# Patient Record
Sex: FEMALE | Race: WHITE | NOT HISPANIC OR LATINO | Employment: OTHER | URBAN - METROPOLITAN AREA
[De-identification: names, ages, dates, MRNs, and addresses within clinical notes are randomized per-mention and may not be internally consistent; named-entity substitution may affect disease eponyms.]

---

## 2024-01-02 ENCOUNTER — TELEPHONE (OUTPATIENT)
Dept: PRIMARY CARE | Facility: CLINIC | Age: 74
End: 2024-01-02

## 2024-01-17 ENCOUNTER — APPOINTMENT (OUTPATIENT)
Dept: VASCULAR SURGERY | Facility: HOSPITAL | Age: 74
End: 2024-01-17

## 2024-02-02 ENCOUNTER — OFFICE VISIT (OUTPATIENT)
Dept: VASCULAR SURGERY | Facility: HOSPITAL | Age: 74
End: 2024-02-02
Payer: COMMERCIAL

## 2024-02-02 VITALS — SYSTOLIC BLOOD PRESSURE: 154 MMHG | HEART RATE: 77 BPM | OXYGEN SATURATION: 95 % | DIASTOLIC BLOOD PRESSURE: 83 MMHG

## 2024-02-02 DIAGNOSIS — I71.21 ASCENDING AORTIC ANEURYSM, UNSPECIFIED WHETHER RUPTURED (CMS-HCC): ICD-10-CM

## 2024-02-02 DIAGNOSIS — I71.23 ANEURYSM OF DESCENDING THORACIC AORTA WITHOUT RUPTURE (CMS-HCC): Primary | ICD-10-CM

## 2024-02-02 PROCEDURE — 1157F ADVNC CARE PLAN IN RCRD: CPT | Performed by: SURGERY

## 2024-02-02 PROCEDURE — 99214 OFFICE O/P EST MOD 30 MIN: CPT | Performed by: SURGERY

## 2024-02-02 PROCEDURE — 1036F TOBACCO NON-USER: CPT | Performed by: SURGERY

## 2024-02-02 PROCEDURE — 99204 OFFICE O/P NEW MOD 45 MIN: CPT | Performed by: SURGERY

## 2024-02-02 PROCEDURE — 1159F MED LIST DOCD IN RCRD: CPT | Performed by: SURGERY

## 2024-02-02 RX ORDER — CLONAZEPAM 0.5 MG/1
0.5 TABLET ORAL DAILY
COMMUNITY
Start: 2015-12-30

## 2024-02-02 RX ORDER — ATENOLOL 50 MG/1
50 TABLET ORAL DAILY
COMMUNITY
Start: 2019-10-08

## 2024-02-02 RX ORDER — FERROUS SULFATE 325(65) MG
1 TABLET, DELAYED RELEASE (ENTERIC COATED) ORAL
COMMUNITY

## 2024-02-02 RX ORDER — FLUOXETINE HYDROCHLORIDE 40 MG/1
2 CAPSULE ORAL DAILY
COMMUNITY
Start: 2023-12-29

## 2024-02-02 RX ORDER — GLIPIZIDE 2.5 MG/1
2.5 TABLET, EXTENDED RELEASE ORAL DAILY
COMMUNITY
Start: 2023-05-04

## 2024-02-02 RX ORDER — VIBEGRON 75 MG/1
1 TABLET, FILM COATED ORAL DAILY
COMMUNITY
Start: 2023-09-19

## 2024-02-02 RX ORDER — TRAMADOL HYDROCHLORIDE 50 MG/1
50 TABLET ORAL EVERY 6 HOURS PRN
COMMUNITY

## 2024-02-02 RX ORDER — ASPIRIN 81 MG/1
81 TABLET ORAL DAILY
COMMUNITY
Start: 2024-01-16

## 2024-02-02 RX ORDER — SOLIFENACIN SUCCINATE 10 MG/1
10 TABLET, FILM COATED ORAL DAILY PRN
COMMUNITY
Start: 2023-09-19

## 2024-02-02 RX ORDER — AMLODIPINE BESYLATE 2.5 MG/1
2.5 TABLET ORAL DAILY
COMMUNITY
Start: 2023-06-12

## 2024-02-02 RX ORDER — ALBUTEROL SULFATE 90 UG/1
1 AEROSOL, METERED RESPIRATORY (INHALATION)
COMMUNITY

## 2024-02-02 RX ORDER — LOPERAMIDE HYDROCHLORIDE 2 MG/1
2 CAPSULE ORAL 3 TIMES DAILY PRN
COMMUNITY
Start: 2023-07-10

## 2024-02-02 RX ORDER — ACETAMINOPHEN 325 MG/1
650 TABLET ORAL EVERY 6 HOURS PRN
COMMUNITY
Start: 2015-12-30

## 2024-02-02 RX ORDER — ATORVASTATIN CALCIUM 10 MG/1
10 TABLET, FILM COATED ORAL DAILY
COMMUNITY
Start: 2023-05-03

## 2024-02-02 RX ORDER — BUPROPION HYDROCHLORIDE 150 MG/1
150 TABLET ORAL DAILY
COMMUNITY

## 2024-02-02 ASSESSMENT — ENCOUNTER SYMPTOMS
LOSS OF SENSATION IN FEET: 0
DEPRESSION: 0
OCCASIONAL FEELINGS OF UNSTEADINESS: 1

## 2024-02-07 ENCOUNTER — HOSPITAL ENCOUNTER (OUTPATIENT)
Dept: RADIOLOGY | Facility: EXTERNAL LOCATION | Age: 74
Discharge: HOME | End: 2024-02-07

## 2024-02-07 NOTE — PROGRESS NOTES
Vascular Surgery Clinic Note    Subjective    History of Present Illness  Hailee Lyons is a pleasant 73 y.o. female with a history of COPD, hypertension, hyperlipidemia, diabetes, lung cancer, and CKD who is presenting to clinic for evaluation of a descending thoracic aortic aneurysm with mention of a penetrating aortic ulcer. The aortic aneurysm was first seen in December 2023 and then demonstrated on repeat scan from January 2024. Both scans performed at an outside hospital and unavailable for review.     The patient denies any history of dissection, aneurysm, tearing chest pain. Her uncle may have had an aneurysm, however, she does not recall where it was located. No other family history of aneurysmal disease or connective tissue disorders. Reports good blood pressure control.    Past Medical History  As above.    Social History  Social History     Tobacco Use    Smoking status: Former     Types: Cigarettes     Quit date: 2021     Years since quitting: 3.1    Smokeless tobacco: Never   Substance Use Topics    Alcohol use: Not on file       Medications  Current Outpatient Medications   Medication Instructions    acetaminophen (TYLENOL) 650 mg, oral, Every 6 hours PRN    albuterol 90 mcg/actuation inhaler 1 puff, inhalation, 3 times daily RT    amLODIPine (NORVASC) 2.5 mg, oral, Daily    aspirin 81 mg, oral, Daily    atenolol (TENORMIN) 50 mg, oral, Daily    atorvastatin (LIPITOR) 10 mg, oral, Daily    buPROPion XL (WELLBUTRIN XL) 150 mg, oral, Daily    clonazePAM (KLONOPIN) 0.5 mg, oral, Daily    ferrous sulfate 325 (65 Fe) MG EC tablet 1 tablet, oral, Daily with breakfast    FLUoxetine (PROzac) 40 mg capsule 2 capsules, oral, Daily    Gemtesa 75 mg tablet 1 tablet, oral, Daily    glipiZIDE XL (GLUCOTROL XL) 2.5 mg, oral, Daily    loperamide (IMODIUM) 2 mg, oral, 3 times daily PRN    solifenacin (VESICARE) 10 mg, oral, Daily PRN    traMADol (ULTRAM) 50 mg, oral, Every 6 hours PRN        Allergies  No Known  Allergies        Objective   Last Recorded Vitals   Blood pressure 154/83, pulse 77, SpO2 95 %.    Physical Exam  CONSTITUTIONAL & GENERAL: Alert and oriented. No acute distress. Well-nourished. Well-groomed.  EYES: No scleral icterus. Conjunctiva clear.  ENMT: No rhinorrhea. Moist oral mucosa.   NECK: Supple. No JVD. No pre- or post-auricular, cervical, supra- or infra-clavicular lymphadenopathy.   THORACIC: Symmetrical expansion and chest rise.  RESPIRATORY: Normal effort on room air. No stridor.  CARDIAC: Regular rate and rhythm.   ABDOMINAL: Slightly protuberant. Nondistended. Soft. Nontender to palpation. Liver edge smooth.   SKIN: Normal turgor. No rashes or ulcers.   MUSCULOSKELETAL: Normal strength and tone.  EXTREMITIES: No digital clubbing or cyanosis. No gross extremity deformity.  NEUROLOGICAL: Grossly intact. No focal deficits. Moves all extremities spontaneously.  PSYCHIATRIC: Appropriate mood and affect.     VASCULAR:   No carotid bruits.   Symmetric, palpable bilateral radial and femoral pulses.    Capillary refill < 2 sec.   No lower extremity edema.    Labs   Lab Results   Component Value Date     09/13/2023    K 4.1 09/13/2023     09/13/2023    BUN 30 (H) 09/13/2023    CREATININE 1.62 (H) 09/13/2023      Lab Results   Component Value Date    WBC 13.6 (H) 09/13/2023    HGB 9.8 (L) 09/13/2023    HCT 30.9 (L) 09/13/2023        Imaging   Imaging was unavailable for review at the initial visit.       Assessment/Plan   Hailee Lyons is a pleasant 73 y.o. female with an ascending aortic aneurysm measuring 4 cm and also a descending thoracic aortic aneurysm measuring 4.9 x 3.6 cm on report with some mention of a penetrating aortic ulcer.    Plan   We will request her images to determine if any intervention is needed  We will call the patient with further plans after reviewing her outside imaging         Patient seen and discussed with the attending, Dr. Andersen.    Gaurav Perez MD  Vascular  Surgery Fellow  Available on Secure Chat

## 2024-02-15 DIAGNOSIS — I71.23 ANEURYSM OF DESCENDING THORACIC AORTA WITHOUT RUPTURE (CMS-HCC): Primary | ICD-10-CM

## 2024-02-23 ENCOUNTER — LAB (OUTPATIENT)
Dept: LAB | Facility: LAB | Age: 74
End: 2024-02-23
Payer: COMMERCIAL

## 2024-02-23 DIAGNOSIS — I71.23 ANEURYSM OF DESCENDING THORACIC AORTA WITHOUT RUPTURE (CMS-HCC): ICD-10-CM

## 2024-02-23 LAB
ANION GAP SERPL CALC-SCNC: 15 MMOL/L (ref 10–20)
BUN SERPL-MCNC: 28 MG/DL (ref 6–23)
CALCIUM SERPL-MCNC: 9.5 MG/DL (ref 8.6–10.6)
CHLORIDE SERPL-SCNC: 102 MMOL/L (ref 98–107)
CO2 SERPL-SCNC: 25 MMOL/L (ref 21–32)
CREAT SERPL-MCNC: 1.4 MG/DL (ref 0.5–1.05)
EGFRCR SERPLBLD CKD-EPI 2021: 40 ML/MIN/1.73M*2
GLUCOSE SERPL-MCNC: 137 MG/DL (ref 74–99)
POTASSIUM SERPL-SCNC: 4.2 MMOL/L (ref 3.5–5.3)
SODIUM SERPL-SCNC: 138 MMOL/L (ref 136–145)

## 2024-02-23 PROCEDURE — 36415 COLL VENOUS BLD VENIPUNCTURE: CPT

## 2024-02-23 PROCEDURE — 80048 BASIC METABOLIC PNL TOTAL CA: CPT

## 2024-02-28 DIAGNOSIS — I71.23 ANEURYSM OF DESCENDING THORACIC AORTA WITHOUT RUPTURE (CMS-HCC): Primary | ICD-10-CM

## 2024-02-29 DIAGNOSIS — I71.23 ANEURYSM OF DESCENDING THORACIC AORTA WITHOUT RUPTURE (CMS-HCC): Primary | ICD-10-CM

## 2024-03-04 ENCOUNTER — HOSPITAL ENCOUNTER (OUTPATIENT)
Dept: RADIOLOGY | Facility: CLINIC | Age: 74
Discharge: HOME | End: 2024-03-04
Payer: COMMERCIAL

## 2024-03-04 DIAGNOSIS — I71.23 ANEURYSM OF DESCENDING THORACIC AORTA WITHOUT RUPTURE (CMS-HCC): ICD-10-CM

## 2024-03-04 PROCEDURE — 71275 CT ANGIOGRAPHY CHEST: CPT | Performed by: RADIOLOGY

## 2024-03-04 PROCEDURE — 74174 CTA ABD&PLVS W/CONTRAST: CPT | Performed by: RADIOLOGY

## 2024-03-04 PROCEDURE — 2550000001 HC RX 255 CONTRASTS: Performed by: NURSE PRACTITIONER

## 2024-03-04 PROCEDURE — 71275 CT ANGIOGRAPHY CHEST: CPT

## 2024-03-04 RX ADMIN — IOHEXOL 75 ML: 350 INJECTION, SOLUTION INTRAVENOUS at 11:30

## 2024-03-08 DIAGNOSIS — I71.23 ANEURYSM OF DESCENDING THORACIC AORTA WITHOUT RUPTURE (CMS-HCC): Primary | ICD-10-CM

## 2024-04-10 ENCOUNTER — HOSPITAL ENCOUNTER (OUTPATIENT)
Dept: RESPIRATORY THERAPY | Facility: HOSPITAL | Age: 74
Discharge: HOME | End: 2024-04-10
Payer: COMMERCIAL

## 2024-04-10 DIAGNOSIS — I71.23 ANEURYSM OF DESCENDING THORACIC AORTA WITHOUT RUPTURE (CMS-HCC): ICD-10-CM

## 2024-04-10 LAB
ARTERIAL PATENCY WRIST A: POSITIVE
BASE EXCESS BLDA CALC-SCNC: 2.5 MMOL/L (ref -2–3)
BODY TEMPERATURE: 37 DEGREES CELSIUS
COHGB MFR BLDA: 2.1 %
DO-HGB MFR BLDA: 1.4 % (ref 0–5)
HCO3 BLDA-SCNC: 26.3 MMOL/L (ref 22–26)
HGB BLDA-MCNC: 11.9 G/DL (ref 12–16)
INHALED O2 CONCENTRATION: 21 %
METHGB MFR BLDA: 0.7 % (ref 0–1.5)
OXYHGB MFR BLDA: 95.8 % (ref 94–98)
PCO2 BLDA: 37 MM HG (ref 38–42)
PH BLDA: 7.46 PH (ref 7.38–7.42)
PO2 BLDA: 86 MM HG (ref 85–95)
SAO2 % BLDA: 99 % (ref 94–100)
SITE OF ARTERIAL PUNCTURE: ABNORMAL

## 2024-04-10 PROCEDURE — 36600 WITHDRAWAL OF ARTERIAL BLOOD: CPT

## 2024-04-10 PROCEDURE — 82810 BLOOD GASES O2 SAT ONLY: CPT

## 2024-04-10 PROCEDURE — 94729 DIFFUSING CAPACITY: CPT

## 2024-04-17 ENCOUNTER — TELEMEDICINE (OUTPATIENT)
Dept: VASCULAR SURGERY | Facility: HOSPITAL | Age: 74
End: 2024-04-17
Payer: COMMERCIAL

## 2024-04-17 ENCOUNTER — APPOINTMENT (OUTPATIENT)
Dept: VASCULAR SURGERY | Facility: HOSPITAL | Age: 74
End: 2024-04-17
Payer: COMMERCIAL

## 2024-04-17 DIAGNOSIS — I71.23 ANEURYSM OF DESCENDING THORACIC AORTA WITHOUT RUPTURE (CMS-HCC): Primary | ICD-10-CM

## 2024-04-17 LAB
MGC ASCENT PFT - FEV1 - POST: 1.78
MGC ASCENT PFT - FEV1 - POST: 1.78
MGC ASCENT PFT - FEV1 - PRE: 1.69
MGC ASCENT PFT - FEV1 - PRE: 1.69
MGC ASCENT PFT - FEV1 - PREDICTED: 2.12
MGC ASCENT PFT - FEV1 - PREDICTED: 2.12
MGC ASCENT PFT - FVC - POST: 2.52
MGC ASCENT PFT - FVC - POST: 2.52
MGC ASCENT PFT - FVC - PRE: 2.46
MGC ASCENT PFT - FVC - PRE: 2.46
MGC ASCENT PFT - FVC - PREDICTED: 2.76
MGC ASCENT PFT - FVC - PREDICTED: 2.76

## 2024-04-17 PROCEDURE — 1157F ADVNC CARE PLAN IN RCRD: CPT | Performed by: SURGERY

## 2024-04-17 PROCEDURE — 1159F MED LIST DOCD IN RCRD: CPT | Performed by: SURGERY

## 2024-04-17 PROCEDURE — 99213 OFFICE O/P EST LOW 20 MIN: CPT | Performed by: SURGERY

## 2024-05-01 NOTE — PROGRESS NOTES
Vascular Surgery Clinic Note    CC: FUV aortic aneurysm     History Of Present Illness:   Hailee Lyons is a 73 y.o. female here as telephone visit to discuss recent CT scan. She has a saccular descending thoracic aortic aneurysm from penetrating aortic ulcer that measures 5 cm, which is stable in size from her CT scan performed in 2024. Her aneurysm extends to just above the celiac axis. She denies chest, back or abdominal pain.     She has a history of COPD, HTN, HLD, DM CKD and stage 1 lung cancer (s/p RT). Her FEV1 is 1.78L.     Medical History:  There is no problem list on file for this patient.       SH:    Social Determinants of Health     Tobacco Use: Medium Risk (2024)    Received from [facility]    Patient History     Smoking Tobacco Use: Former     Smokeless Tobacco Use: Never     Passive Exposure: Never   Alcohol Use: Not on file   Financial Resource Strain: Not on File (2023)    Received from [facility] in [place]     Financial Resource Strain     Financial Resource Strain: 0   Food Insecurity: Not on File (2023)    Received from [facility] in [place]     Food Insecurity     Food: 0   Transportation Needs: Not on File (2023)    Received from [facility] in [place]     Transportation Needs     Transportation: 0   Physical Activity: Not on File (2023)    Received from [facility] in [place]     Physical Activity     Physical Activity: 0   Stress: Not on File (2023)    Received from [facility] in [place]     Stress     Stress: 0   Social Connections: Not on File (2023)    Received from [facility] in [place]     Social Connections     Social Connections and Isolation: 0   Intimate Partner Violence: Not on file   Depression: Not at risk (2019)    Received from [facility]    PHQ-2     PHQ2 Score: 0   Housing Stability: Not on File (2023)    Received from [facility] in [place]     Housing Stability     Housin   Utilities: Not on file   Digital Equity: Not on file   Health Literacy: Not on file        FH:  No family history on file.     Allergies:   No Known  Allergies    ROS:  All systems were reviewed and noted to be negative, other than described above.     Objective:  Last Recorded Vitals  There were no vitals taken for this visit.    Meds:   Current Outpatient Medications   Medication Instructions    acetaminophen (TYLENOL) 650 mg, oral, Every 6 hours PRN    albuterol 90 mcg/actuation inhaler 1 puff, inhalation, 3 times daily RT    amLODIPine (NORVASC) 2.5 mg, oral, Daily    aspirin 81 mg, oral, Daily    atenolol (TENORMIN) 50 mg, oral, Daily    atorvastatin (LIPITOR) 10 mg, oral, Daily    buPROPion XL (WELLBUTRIN XL) 150 mg, oral, Daily    clonazePAM (KLONOPIN) 0.5 mg, oral, Daily    ferrous sulfate 325 (65 Fe) MG EC tablet 1 tablet, oral, Daily with breakfast    FLUoxetine (PROzac) 40 mg capsule 2 capsules, oral, Daily    Gemtesa 75 mg tablet 1 tablet, oral, Daily    glipiZIDE XL (GLUCOTROL XL) 2.5 mg, oral, Daily    loperamide (IMODIUM) 2 mg, oral, 3 times daily PRN    solifenacin (VESICARE) 10 mg, oral, Daily PRN    traMADol (ULTRAM) 50 mg, oral, Every 6 hours PRN       Exam:  Unable to obtain - virtual visit     Imaging Reviewed:  CT angio chest abdomen pelvis 03/04/2024    Narrative  Interpreted By:  Rodrick Breen and Baker Zachary  STUDY:  CT ANGIO CHEST ABDOMEN PELVIS;  3/4/2024 11:28 am    INDICATION:  Signs/Symptoms:descending thoracic aortic aneurysm.    Patient is a 73-year-old female with history of stage I non-small  cell lung cancer of the right upper lobe status post SBRT on 03/2021.  Review of outside imaging revealed progressive consolidative airspace  opacities within the right perihilar region consistent with post  radiation changes. History of descending thoracic aortic aneurysms  and penetrating aortic ulcers. Presents for further evaluation.    COMPARISON:  CT abdomen pelvis on 01/07/2024. CT chest on 12/29/2023    ACCESSION NUMBER(S):  [id number]    ORDERING CLINICIAN:  LONA JORGE    PROCEDURE:  CT ANGIOGRAM OF THE CHEST, ABDOMEN  AND PELVIS    TECHNIQUE:  High-resolution contrast-enhanced helical CT of the chest, abdomen,  and pelvis was performed, without contrast and timed to the arterial  phase.  3-D processing was performed by the physician on an  independent work station, with MIP and volume-rendering techniques.  Total of  ml of  was injected intravenously during the examination.  The study was performed without oral contrast. The patient tolerated  the injection without complications.    FINDINGS:  VASCULAR:    Pulmonary Arteries: Mild dilation of the main pulmonary artery,  measuring approximately 3.1 cm. No acute pulmonary embolism.    Thoracic Aorta: Non-contrast images show no evidence of acute  intramural hematoma. Aneurysmal dilatation of the distal descending  thoracic aorta measuring approximately 4.5 x 4.1 cm in the transaxial  dimension, mildly increased in size compared to prior exam. There is  redemonstration of a large penetrating aortic ulcer within the  anterior aspect of the distal descending thoracic aorta, measuring  approximately 4 x 2.1 cm (series 501, image 554). Greatest true the  axial diameter of the aorta at this level measures 5.0 cm. There is  an additional smaller penetrating aortic ulcer more inferiorly within  the left aspect of the distal descending thoracic aorta (series 501,  image 660), similar to prior exam. More upstream, there is a left  laterally extending penetrating atherosclerotic are sorry arising  from the upstream aspect of the descending thoracic aorta.    Images of the abdominal aorta demonstrate diffuse atherosclerotic  change without significant focal stenosis or aneurysm.    Celiac artery demonstrates no significant focal stenosis.    Superior mesenteric artery demonstrates no significant focal stenosis.    Inferior mesenteric artery demonstrates no significant focal stenosis.    There is a single right renal artery.  Right renal artery  demonstrates  no significant focal stenosis.   There  is a single  renal vein which is patent.    There  is a single left renal artery.  Left renal artery demonstrates  at least mild ostial stenosis. There  is a single renal vein which is  patent.    Right common iliac artery  demonstrates diffuse atherosclerotic  change without significant focal stenosis. Right external iliac  artery  is widely patent with no significant stenosis. Right internal  iliac artery  demonstrates diffuse atherosclerotic change without  significant focal stenosis.    Left common iliac artery  demonstrates diffuse atherosclerotic change  without significant focal stenosis. Left external iliac artery  is  widely patent with no significant stenosis. Left internal iliac  artery  demonstrates diffuse atherosclerotic change without  significant focal stenosis.    NONVASCULAR FINDINGS:    CHEST:    LUNG/PLEURA/LARGE AIRWAYS:  Redemonstration of irregular consolidation within the right upper  lobe within the right perihilar region, measuring approximately 3.7 x  3.6 cm, overall similar in size and appearance to prior exam.  Additional irregular consolidative opacities within the right lower  lobe along the right major fissure, similar to prior exam. No new  lung masses or pulmonary nodules identified. No pleural effusion or  pneumothorax.    HEART:  The heart is normal in size. No pericardial effusion. Severe coronary  artery calcifications.    MEDIASTINUM AND SILVINA:  No mediastinal, hilar or axillary lymph nodes are present. The  esophagus appears normal.    CHEST WALL AND LOWER NECK:  Within normal limits. Redemonstration of a 2.5 x 2 cm hypodense  lesion within the left thyroid lobe, similar in size and appearance  to prior exam. Additional hypodense lesion within the inferior right  thyroid lobe, similar to prior exam.    ABDOMEN:    LIVER:  The liver is normal in size without evidence of focal liver lesions.    BILE DUCTS:  The intrahepatic and extrahepatic ducts are not  dilated.    GALLBLADDER:  Hyperdense layering within the gallbladder, likely representing small  stone. No gallbladder distention or wall thickening.    PANCREAS:  The pancreas appears unremarkable without evidence of ductal  dilatation or masses.    SPLEEN:  The spleen is normal in size. Similar size and appearance multiple  hypodense lesions within the inferior aspect of the spleen, favored  to represent simple cysts. No new splenic lesions identified.    ADRENAL GLANDS:  Redemonstration of 2.1 cm hypodense lesion within the left adrenal  gland, similar to prior exam. Right adrenal gland is normal in  appearance.    KIDNEYS AND URETERS:  The kidneys mildly atrophic and enhance symmetrically.  Redemonstration of multiple hypoattenuating lesions throughout the  bilateral kidneys, similar in size and appearance to prior exam,  favored to represent simple renal cysts. Small nonobstructing renal  calculi within the bilateral kidneys. No hydroureteronephrosis.    PELVIS:    BLADDER:  The urinary bladder appears normal without abnormal wall thickening.    REPRODUCTIVE ORGANS:  No pelvic masses.    BOWEL:  The stomach, small and large bowel are normal in appearance without  wall thickening or obstruction. Normal appendix.    PERITONEUM/RETROPERITONEUM/LYMPH NODES:  There is no free or loculated fluid collection, no free  intraperitoneal air. The retroperitoneum appears normal. No  abdominopelvic lymphadenopathy is present.    BONES AND ABDOMINAL WALL:  No suspicious osseous lesions are identified. Degenerative discogenic  disease is noted in the lower thoracic and lumbar spine. Note is made  of asymmetric atrophy of the right psoas muscle. Postsurgical changes  from right hip arthroplasty again noted. Small fat containing  periumbilical hernia again noted, mildly increased in size compared  to prior exam.    Impression  1. Aneurysmal dilatation of the descending thoracic aorta, measuring  approximately 4.5 x 4.1 cm,  mildly increased in size compared to  prior exam. Aneursymal dilation is associated with a penetrating  aortic ulcer involving the anterior aspect of the distal descending  thoracic aorta, similar compared to prior exam. The greatest diameter  of the ulcer/lumen at this level measures 5.0 cm in diameter.  2. Additional smaller penetrating aortic ulcers within the upstream  and distal descending thoracic aorta.  3. Similar size and appearance of irregular consolidation within the  perihilar right upper lobe, favored to represent post radiation  changes, however underlying recurrence is not entirely excluded.  Correlation with prior imaging recommended for further evaluation.  4. Nonobstructing renal calculi bilaterally, similar to prior exam.  5. Additional chronic findings as above.    I personally reviewed the images/study and I agree with the findings  as stated by Dr. Kevin Westbrook M.D. This study was interpreted at  [facility], [place], [place].    MACRO:  None    Signed by: Rodrick Breen 3/5/2024 5:06 PM  Dictation workstation:   [id number]      Assessment & Plan:  1. Aneurysm of descending thoracic aorta without rupture (CMS-HCC)          Descending thoracic aortic saccular aneurysm from KERRY.   Currently measures 5 cm.   Blood pressure control  Continue aspirin & statin   Will touch base with oncologist re: prognosis   Will discuss case at aortic conference     Lisset Teran, APRN-CNP